# Patient Record
Sex: MALE | Race: WHITE
[De-identification: names, ages, dates, MRNs, and addresses within clinical notes are randomized per-mention and may not be internally consistent; named-entity substitution may affect disease eponyms.]

---

## 2017-11-15 NOTE — REP
Clinical:  Pain.

 

Technique:  Internal rotation, external rotation, and Y view of the right

shoulder.

 

Findings:

Cortical irregularity, spurring and possible loose bodies involving the

acromioclavicular joint cannot be excluded.  The glenohumeral joint appears

intact and normal.  Subacromial space appears normal.

 

Impression:

Moderate arthritic degenerative changes involving the acromioclavicular joint.

 

 

Signed by

Robert Cage MD 11/14/2017 05:35 P

## 2020-02-27 NOTE — ECGEPIP
[facility] - ED

                                       

                                       Test Date:    2020

Pat Name:     LUIS MIGUEL CHAUHAN           Department:   

Patient ID:   [id number]                 Room:         -

Gender:       Male                     Technician:   julia

:          1990               Requested By: ANASTASIYA SALEH

Order Number: [id number]     Reading MD:   Anastasiya Long

                                 Measurements

Intervals                              Axis          

Rate:         52                       P:            51

HI:           142                      QRS:          49

QRSD:         101                      T:            45

QT:           440                                    

QTc:          411                                    

                           Interpretive Statements

SINUS BRADYCARDIA WITH sinus arrythmia

Comparison tracing not on file

Electronically Signed on 2020 23:20:32 EST by Anastasiya Long

## 2020-03-01 NOTE — HPEPDOC
[facility] Medical History & Physical


Date of Admission


Mar 1, 2020


Date of Service:  Mar 1, 2020


Attending Physician:  GONDAL,KHUBAIB N. MD





History and Physical


CHIEF COMPLAINT: Admitted to inpatient mental health unit for hallucinations





HISTORY OF PRESENT ILLNESS: 29-year-old male with past medical history of 

extensive IV drug use is admitted to inpatient mental health unit for 

hallucinations. Patient reports multiple occurrences of insomnia for days after 

taking lots of uppers. During this event he took Maddi, reports being awake for 

days, started hallucinating about the hospital. He denies any thoughts of 

depression or suicide. He denies any previous history of infection including 

cellulitis, bacteremia or endocarditis. Patient denies any history of illness 

from sharing needles. He has no complaints at this time, denies any shortness of

breath, chest pain, nausea, vomiting, abdominal pain, diarrhea or constipation.





10 point review of system is negative except for above





PAST MEDICAL HISTORY:


1. Drug abuse.





PAST SURGICAL HISTORY:


1. Left hand surgery due to injury.





SOCIAL HISTORY:


Current smoker, smokes half a pack per day.


Denies alcohol use.


Active drug user, currently using Maddi





FAMILY HISTORY:


Denies family history of malignancy or heart disease





ALLERGIES: Please see below.





HOME MEDICATIONS: Please see below. 





PHYSICAL EXAMINATION:


VITAL SIGNS: Please see below.


GENERAL: No distress


HEENT: Normocephalic, atraumatic, moist mucous membranes


NECK: Supple


CARDIOVASCULAR EXAMINATION: S1, S2, no murmurs


RESPIRATORY EXAMINATION: Clear to auscultation, no wheezing


ABDOMINAL EXAMINATION: Soft, nontender, nondistended, positive bowel sounds


EXTREMITIES: Range of motion intact


SKIN: Multiple track marks noted on both arms


NEUROLOGICAL EXAMINATION: Alert and oriented 3, no focal deficits 


PSYCHIATRIC EXAMINATION: Calm and cooperative





LABORATORY DATA: See below.





MICROBIOLOGY: Please see below. 





ASSESSMENT: 29-year-old male with history of drug abuse, admitted to inpatient 

mental health unit for hallucinations





PLAN:


1. Hallucinations.


 Management as per primary team





2. Elevated LFTs.


 Possibly related to hepatitis given history of IV drug use, hepatitis panel 

ordered. Patient is symptomatic





Vital Signs





Vital Signs








  Date Time  Temp Pulse Resp B/P (MAP) Pulse Ox O2 Delivery O2 Flow Rate FiO2


 


3/1/20 06:52 99.0 66 16 118/64 (82)    


 


2/28/20 17:22     100 Room Air  











Home Medications


No Active Prescriptions or Reported Meds





Allergies


Coded Allergies:  


     No Known Allergies (Unverified , 2/27/20)





A-FIB/CHADSVASC


A-FIB History


Current/History of A-Fib/PAF?:  No











GONDAL,KHUBAIB N. MD            Mar 1, 2020 12:30

## 2020-03-01 NOTE — MHHPE
DATE OF ADMISSION:   02/28/2020

 

VITAL SIGNS:  Blood pressure 129/84, pulse 113, temperature 98.4.

 

CHIEF COMPLAINT:   "I was hallucinating."

 

SUBJECTIVE:   He is 29 years old.  He is single.  He stays with his mother and

apparently moved back to this area, living with his mother recently, and prior to

that was in [place], [place], where, according to the emergency room chart, he

was with an ex girlfriend.

 

He says that he has been hallucinating and that he has tended to do that whenever

he has used drugs, says most recently was using heroin, was injecting it, as well

as "Maddi" (MDMA) and says was up for several nights and then began hearing

voices.  He says that they belong to familiar people, friends of his as well,

says they would address him, but would not talk among themselves.  He denies that

they were giving him any commands.  He says that he feels that they are

diminished now.

 

He says that he has had similar experiences in the past when he has used drugs

and has stayed up several nights in a row.

 

He says that he realizes the hallucinations and that they are always associated

with his drug use.

 

He says that there are other times when he is not using drugs but that he still

has periods when he is up, not sleeping, feels more irritable during that time,

says feels somewhat tired, acknowledges is more impulsive during those periods,

financially and socially but did not go into details, and that those periods last

for a few days and then he tends to "crash."

 

In the emergency room , he had suggested that he had microchips in his body, says

did not recall saying that or feeling that, does say when he is using drugs that

his thoughts become disorganized somewhat.

 

Denies that he has had thoughts of harming himself.

 

He also says that he has had periods when he does not sleep much, at least since

his early 20s, possibly earlier.

 

When in the emergency room, had suggested that he had microchips in his body, he

thought that he was getting shocked in the right flank, but has reported no such

thoughts now.  He says that he does not remember that, and that he has had such

thoughts when using drugs in the past.

 

The record also suggests that he has used Narcan in the past.

 

Other thoughts when seen in the emergency room included belief that people are

touching his penis or putting objects into his anus and that he could feel it

because it was in his stomach.

 

PAST PSYCHIATRIC HISTORY:  He was apparently hospitalized at [facility] in

2019 in the summer, had attempted killing himself by cutting his wrist.  Unclear

if he attends any outpatient care.  Per history, he has a history of depression,

anxiety, and posttraumatic stress disorder (PTSD), but I am not aware of

details.

 

SUBSTANCE ABUSE HISTORY:  He has a history of substance abuse, opiates, Maddi.

He says that he is due to go to an outpatient rehabilitation facility, he is

unclear where it is, then says that he thinks that he may have needed to have

started yesterday.

 

SOCIAL HISTORY:  Details are not known in any great amount, but he stays with his

mother, he was in [place], [place] just prior to this.  He suggests that his

mother has difficulties with moods and that she is on medicine, but she has been

doing well.  He also suggests a sibling has similar difficulties.

 

MENTAL STATUS EXAMINATION:

He is fairly neat, seems to have had some scratches on his face.  Cooperative

though a bit guarded. There is no agitation.  No psychomotor retardation.  He is

coherent.  Affect is restricted but shows reactivity. Denies any suicidal

thoughts or intents. No homicidal ideas or intents.  Currently, he does not

appear to be internally preoccupied.  No fluctuation of consciousness.  No overt

delusions elicited at present either.   Cognition is grossly intact.  Intellect

is average.  He is able to maintain and shift attention adequately. Judgment and

insight are questionable.

 

ASSESSMENT:

1.  Other specified psychotic disorder.

2.  Rule out bipolar disorder.

3.  Rule out psychotic disorder due to drug misuse.

4.  Opiate use disorder.

 

The patient describes periods of hallucinations, possibly paranoia though unable

to describe that, but only when using drugs.  He says that he tends to stay up

for several nights and the drugs may be inducing possible manic episodes with

psychosis.

 

He suggests that he has had mood fluctuations independent of drug use and this

brings into question the possibility of bipolar disorder in addition to secondary

chang.  He may well not be a reliable historian either.

 

PLAN:   He is admitted to the inpatient psychiatry unit and placed on relevant

precautions. I would suggest obtaining collateral information for a nelson

picture.   Would also treat symptomatically but would hold off on using a

psychotropic on a scheduled basis, may not need one.  The possibility; however,

of using a mood stabilizer, depending on further information obtained, needs to

be considered.

 

He will be involved in individual, group and milieu therapy.   He will receive a

consultation from medicine if indicated.  He will be discharged with followup

once he is stable.  I would anticipate a 5 to 7 day stay.

 

The assessment took 45 minutes.

## 2020-03-02 NOTE — MHIPN
DATE:  03/01/2020

 

VITAL SIGNS:  Blood pressure 118/64, pulse 66, temperature 99.

 

CHIEF COMPLAINT:   Says feels okay.

 

SUBJECTIVE:   He is seen for followup in the presence of staff.  Says feels okay

but bored.  He says that he slept well, took the trazodone, which he says helped.

 

He denies any auditory hallucinations.  He says that he was visited by his mother

and that went well.

 

Says has been on gabapentin at 600 mg three times a day for a few years.  He says

that it is used in his case for mood stability, as well as "nerve pain."

 

He suggests that it helps with his mood stability, though I am not sure that is

accurate.

 

MENTAL STATUS EXAMINATION

He is lying in bed.  He is cooperative, though possibly a bit superficially so.

No agitation.  No psychomotor retardation.  He is coherent.  Affect is restricted

but reactive.  Denies any thoughts of harming himself or anyone else.  Denies any

auditory or visual hallucinations.  Does not appear to be internally preoccupied.

No delusions elicited.  Cognition is grossly intact.  Judgment and insight fair,

possibly improved.

 

ASSESSMENT:

1.  Other specified psychotic disorder.

2.  Possibility of drug induced hallucinations ought to be considered.

 

There is also the possibility of bipolar disorder concomitant with the substance

misuse.

 

PLAN:  Continue current care.  Obtain collateral information.  We will look at

resuming the gabapentin at 600 mg and titrate it upwards, start off with it being

taken once a day.

 

Collateral information would help clarify the diagnosis further.

 

He is to be encouraged to participate in activities in the unit.  He will be

seeing the treatment team, as well as the assigned psychiatrist tomorrow.

## 2020-03-02 NOTE — MHIPNPDOC
[facility] Progress Note


Progress Note








Inpatient Progress Note


Kwaku Travis


MRN: N/A


Date of Birth: N/A


Date of Service: 03/02/2020


History of Present Illness


The patient, a 29-year-old man with a history of significant substance use, 

presents intoxicated with delusions and paranoia.


Interval History





Narrative: The patient reports feeling much improved, does feel some withdrawal 

from his heroin use prior to admission.


Affective: Denies any symptoms.


Psychotic: Denies any symptoms.


Anxiety: Worries about being discharged.


Eating and sleeping behaviors: Within normal limits.


Group Attendance: Infrequent.


Medication Side effects: See ROS below


Behavioral problems/significant events overnight: None reported.


Staff Report: Patient generally amenable, although minimally involved. 





Review Of Systems





General: Denies fever or appetite changes 


Cardiovascular: Denies Chest pain or palpations


GI: Denies Nausea, vomiting, or bowel changes


Respiratory: Denies shortness of breath or cough


Neuro: Denies dizziness, tremors


Derm: Denies any rashes or pruritus


: Denies any dysuria or urinary problems 


MSK:  Reports significant back pain


HEENT: Denies any vision changes or headaches


Psychotherapy


None on this visit.


Vital Signs


Reviewed.


Mental Status Examination





General: Well dressed with good hygiene


Speech: Spontaneous and fluid


Thought processes: Linear and logical


MSK: Smooth and coordinated gait, no signs of tremors or involuntary orofacial 

movements


Thought content: Future orientated


Abstract reasoning, and computation: Intact


Description of associations: Intact


Description of abnormal or psychotic thoughts: Denies any suicidal or homicidal 

ideation. Denies any auditory or visual hallucinations. Does not appear to be 

responding to internal stimuli. Does not appear to be endorsing any bizarre or 

paranoid ideation.


Judgment: fair


Insight: fair


Orientation: Alert and orientated 3


Cognition: Grossly normal


Recent and remote memory: Intact


Attention span and concentration: Intact


Fund of knowledge: Adequate


Mood: "okay"


Affect: Euthymic with a full range 


Diagnoses


Unspecified psychotic disorder.


Highly likely substance induced.


Opioid use disorder, severe.


Hallucinogen use disorder, severe.


Assessment and Plan


Unspecified psychotic disorder: Resolved, likely substance induced.


Opioid use disorder: One dose of buprenorphine to detox patient. Discussed with 

patient about outpatient addiction resources


Hallucinogen use disorder: Recommend outpatient treatment versus rehab.


Disposition


Discharge tomorrow if patient continues to have a normal mental status exam and 

is in behavioral control.


Time Spent


15 minutes


Monday





Vital Signs





Vital Signs








  Date Time  Temp Pulse Resp B/P (MAP) Pulse Ox O2 Delivery O2 Flow Rate FiO2


 


3/2/20 06:30 97.6 104 14 126/81 (96)    


 


2/28/20 17:22     100 Room Air  











Laboratory Data


24H Labs


Laboratory Tests 2


3/2/20 06:18: 





Current Medications





Current Medications








 Medications


  (Trade)  Dose


 Ordered  Sig/Thom


 Route


 PRN Reason  Start Time


 Stop Time Status Last Admin


Dose Admin


 


 Acetaminophen


  (Tylenol Tab)  650 mg  Q6HP  PRN


 PO


 HEADACHE or DISCOMFORT  2/28/20 15:30


    2/29/20 19:41





 


 Al Hydrox/Mg


 Hydrox/Simethicone


  (Mylanta)  30 ml  Q4HP  PRN


 PO


 HEARTBURN/INDIGESTION  2/28/20 15:30


     





 


 Gabapentin


  (Neurontin)  300 mg  BID


 PO


   3/1/20 09:00


    3/2/20 09:13





 


 Home Med


  (Med Rec


 Complete!)    ASDIRECTED


 XX


   2/28/20 14:00


 2/28/20 13:50 DC  





 


 Magnesium


 Hydroxide


  (Milk Of


 Magnesia)  30 ml  DAILYPRN  PRN


 PO


 CONSTIPATION  2/28/20 15:30


     





 


 Olanzapine


  (ZyPREXA


 **ZYDIS**)  5 mg  Q6HP  PRN


 PO


 ANXIETY/AGITATION  2/29/20 19:00


    3/1/20 22:46





 


 Trazodone HCl


  (Desyrel)  50 mg  QHSP  PRN


 PO


 INSOMNIA  2/28/20 15:30


    3/1/20 20:08














Allergies


Coded Allergies:  


     No Known Allergies (Unverified , 2/27/20)











STEW RODRIGUEZ DO               Mar 2, 2020 10:29

## 2020-03-03 NOTE — MHDSPDOC
[facility] Discharge Summary


Discharge Summary


DATE OF ADMISSION: Feb 28, 2020 at 15:27 


DATE OF DISCHARGE: 3/3/20





Kwaku Travis





Discharge


Kwaku Travis


Select Gender


MRN: N/A


Date of Birth: [birth year]


Date of Service: 03/03/2020


Diagnoses





Unspecified psychotic disorder.


Highly likely substance induced.


Opioid use disorder, severe.


Hallucinogen use disorder, severe.


History of Present Illness





The patient, a 29-year-old man with a history of significant substance use, p

resents intoxicated with delusions and paranoia.


Consultants Involved


Hospitalist/PCP screening


Treatment and Progress On The Unit


The patient was admitted to the inpatient mental health unit. It appeared quite 

obvious that initially he had been psychotic from intoxication on a number of 

hallucinogens. He did well on just supportive treatment and returned to a normal

mental status. He had no major behavioral problems and attended groups at times.

He was met with several times where it appeared quite clear that he had returned

to his baseline level of understanding of the situation. His mother had reported

concerns as his behavior had been quite dysregulated when he was intoxicated and

that he has significantly relapsed on a number of different substances over the 

last year. She relates some concerns the day prior to discharge, when I had 

spoken to her no new information was gleaned other than his relapse had caused 

him to become very dysregulated, but she did admit when he was sober he had no 

symptoms. I discussed with the patient the options of an antipsychotic and the 

risks and benefits, especially with the concern that they would be generally 

contraindicated for long-term use given that he primarily has likely substance-

induced psychosis from his methamphetamine and kendell. I gave suggestions to the 

mother about assisted outpatient treatment and rehab referrals to the patient, 

he was precontemplative but was open to the idea of coming to our addiction 

clinic to see about further treatment. He was given 1 dose of buprenorphine to 

soothe his heroin withdrawals with positive effects.


Discharge Assessment


The patient, a 29-year-old man, with a history of significant substance use over

the past year, who's recently relapsed on heroin and multiple hallucinogens pres

ents intoxicated with psychotic symptoms that resolved without any significant 

interventions, leading this provider to believe they are primarily substance 

induced. Information from his mother doesn't contribute to the diagnostic or 

prognostic picture significantly, as her concerns are validated when spoken to, 

she was explained the limits of involuntary commitment as the patient was not 

demonstrating any further symptoms since his intoxication had resolved.


 The patient at the time of discharge did not meet criteria for involuntary 

admission/extension due to having a normal mental status exam, fair insight into

the situation, They are engaged in the discharge process, as well as being 

friendly and amenable in behavioral control and havent been engaging in any 

observed concerning behavior or ideation recently. They decline voluntary 

extension/admission at this time and must be discharged in good homa, as Im 

unable to make a case for holding the patient against their will. They may have 

historical risk factors of admissions and other interactions with psychiatry 

however, those are not modifiable from a clinical perspective. The patient will 

need to be discharged in good homa.  


Mental Status Examination


General: Well dressed with good hygiene


Speech: Spontaneous and fluid


Thought processes: Linear and logical


MSK: Smooth and coordinated gait, no signs of tremors or involuntary orofacial 

movements


Thought content: Future orientated


Abstract reasoning, and computation: Intact


Description of associations: Intact


Description of abnormal or psychotic thoughts: Denies any suicidal or homicidal 

ideation. Denies any auditory or visual hallucinations. Does not appear to be 

responding to internal stimuli. Does not appear to be endorsing any bizarre or 

paranoid ideation.


Judgment: fair


Insight: fair


Orientation: Alert and orientated 3


Cognition: Grossly normal


Recent and remote memory: Intact


Attention span and concentration: Intact


Fund of knowledge: Adequate


Mood: "okay"


Affect: Euthymic with a full range


Follow Up








The social work team worked during the predischarge meeting in order to evaluate

for further issues of lethality address them fully before discharge. They worked

on safety planning with the patient's family members in order to ensure that the

patient will have a safe and effective discharge.


Time Spent


The amount of time spent in the coordination of care for this patient was approx

imately 50 minutes.





Vital Signs/I&Os





Vital Signs








  Date Time  Temp Pulse Resp B/P (MAP) Pulse Ox O2 Delivery O2 Flow Rate FiO2


 


3/3/20 06:40 97.5 83 12 121/73 (89)  Room Air  


 


2/28/20 17:22     100   











Medications


No Active Prescriptions or Reported Meds





Allergies


Coded Allergies:  


     No Known Allergies (Unverified , 2/27/20)











STEW RODRIGUEZ DO               Mar 3, 2020 10:54

## 2023-01-24 ENCOUNTER — HOSPITAL ENCOUNTER (INPATIENT)
Dept: HOSPITAL 53 - M ED | Age: 33
LOS: 3 days | Discharge: HOME | DRG: 750 | End: 2023-01-27
Attending: PSYCHIATRY & NEUROLOGY | Admitting: PSYCHIATRY & NEUROLOGY
Payer: COMMERCIAL

## 2023-01-24 VITALS — BODY MASS INDEX: 25.77 KG/M2 | WEIGHT: 190.26 LBS | HEIGHT: 72 IN

## 2023-01-24 DIAGNOSIS — Z56.0: ICD-10-CM

## 2023-01-24 DIAGNOSIS — Z79.899: ICD-10-CM

## 2023-01-24 DIAGNOSIS — Z91.14: ICD-10-CM

## 2023-01-24 DIAGNOSIS — F25.9: Primary | ICD-10-CM

## 2023-01-24 DIAGNOSIS — F13.10: ICD-10-CM

## 2023-01-24 DIAGNOSIS — F17.200: ICD-10-CM

## 2023-01-24 LAB
ALBUMIN SERPL BCG-MCNC: 4.7 G/DL (ref 3.2–5.2)
ALP SERPL-CCNC: 75 U/L (ref 46–116)
ALT SERPL W P-5'-P-CCNC: 52 U/L (ref 7–40)
AMPHETAMINES UR QL SCN: NEGATIVE
APAP SERPL-MCNC: < 2 UG/ML (ref 10–20)
AST SERPL-CCNC: 92 U/L (ref ?–34)
BARBITURATES UR QL SCN: NEGATIVE
BENZODIAZ UR QL SCN: NEGATIVE
BILIRUB CONJ SERPL-MCNC: 0.4 MG/DL (ref ?–0.4)
BILIRUB SERPL-MCNC: 0.9 MG/DL (ref 0.3–1.2)
BUN SERPL-MCNC: 24 MG/DL (ref 9–23)
BZE UR QL SCN: NEGATIVE
CALCIUM SERPL-MCNC: 9.2 MG/DL (ref 8.5–10.1)
CANNABINOIDS UR QL SCN: NEGATIVE
CHLORIDE SERPL-SCNC: 99 MMOL/L (ref 98–107)
CO2 SERPL-SCNC: 24 MMOL/L (ref 20–31)
CREAT SERPL-MCNC: 0.77 MG/DL (ref 0.7–1.3)
ETHANOL SERPL-MCNC: 0 % (ref 0–0.01)
GFR SERPL CREATININE-BSD FRML MDRD: > 60 ML/MIN/{1.73_M2} (ref 60–?)
GLUCOSE SERPL-MCNC: 84 MG/DL (ref 60–100)
HCT VFR BLD AUTO: 40.2 % (ref 42–52)
HGB BLD-MCNC: 13.1 G/DL (ref 13.5–17.5)
MCH RBC QN AUTO: 28.1 PG (ref 27–33)
MCHC RBC AUTO-ENTMCNC: 32.6 G/DL (ref 32–36.5)
MCV RBC AUTO: 86.1 FL (ref 80–96)
METHADONE UR QL SCN: NEGATIVE
OPIATES UR QL SCN: NEGATIVE
PCP UR QL SCN: NEGATIVE
PLATELET # BLD AUTO: 341 10^3/UL (ref 150–450)
POTASSIUM SERPL-SCNC: 3.9 MMOL/L (ref 3.5–5.1)
PROT SERPL-MCNC: 7.8 G/DL (ref 5.7–8.2)
RBC # BLD AUTO: 4.67 10^6/UL (ref 4.3–6.1)
SALICYLATES SERPL-MCNC: < 3 MG/DL (ref ?–30)
SODIUM SERPL-SCNC: 137 MMOL/L (ref 136–145)
TSH SERPL DL<=0.005 MIU/L-ACNC: 0.41 UIU/ML (ref 0.55–4.78)
WBC # BLD AUTO: 11.7 10^3/UL (ref 4–10)

## 2023-01-24 RX ADMIN — BUPRENORPHINE AND NALOXONE SCH TAB: 8; 2 TABLET SUBLINGUAL at 19:54

## 2023-01-24 RX ADMIN — GABAPENTIN SCH MG: 300 CAPSULE ORAL at 19:54

## 2023-01-24 SDOH — ECONOMIC STABILITY - INCOME SECURITY: UNEMPLOYMENT, UNSPECIFIED: Z56.0

## 2023-01-25 VITALS — SYSTOLIC BLOOD PRESSURE: 137 MMHG | DIASTOLIC BLOOD PRESSURE: 79 MMHG

## 2023-01-25 RX ADMIN — NICOTINE SCH PATCH: 21 PATCH, EXTENDED RELEASE TRANSDERMAL at 09:00

## 2023-01-25 RX ADMIN — BUPRENORPHINE AND NALOXONE SCH TAB: 8; 2 TABLET SUBLINGUAL at 10:12

## 2023-01-25 RX ADMIN — GABAPENTIN SCH MG: 300 CAPSULE ORAL at 18:04

## 2023-01-25 RX ADMIN — GABAPENTIN SCH MG: 300 CAPSULE ORAL at 10:12

## 2023-01-25 RX ADMIN — GABAPENTIN SCH MG: 300 CAPSULE ORAL at 20:42

## 2023-01-26 VITALS — DIASTOLIC BLOOD PRESSURE: 80 MMHG | SYSTOLIC BLOOD PRESSURE: 150 MMHG

## 2023-01-26 VITALS — SYSTOLIC BLOOD PRESSURE: 109 MMHG | DIASTOLIC BLOOD PRESSURE: 57 MMHG

## 2023-01-26 RX ADMIN — BUPRENORPHINE AND NALOXONE SCH TAB: 8; 2 TABLET SUBLINGUAL at 14:00

## 2023-01-26 RX ADMIN — BUPRENORPHINE AND NALOXONE SCH TAB: 8; 2 TABLET SUBLINGUAL at 10:50

## 2023-01-26 RX ADMIN — NICOTINE SCH PATCH: 21 PATCH, EXTENDED RELEASE TRANSDERMAL at 09:00

## 2023-01-26 RX ADMIN — GABAPENTIN SCH MG: 300 CAPSULE ORAL at 20:35

## 2023-01-26 RX ADMIN — GABAPENTIN SCH MG: 300 CAPSULE ORAL at 16:58

## 2023-01-26 RX ADMIN — GABAPENTIN SCH MG: 300 CAPSULE ORAL at 10:50

## 2023-01-27 VITALS — DIASTOLIC BLOOD PRESSURE: 58 MMHG | SYSTOLIC BLOOD PRESSURE: 124 MMHG

## 2023-01-27 RX ADMIN — BUPRENORPHINE AND NALOXONE SCH TAB: 8; 2 TABLET SUBLINGUAL at 09:07

## 2023-01-27 RX ADMIN — GABAPENTIN SCH MG: 300 CAPSULE ORAL at 09:07

## 2023-01-27 RX ADMIN — NICOTINE SCH PATCH: 21 PATCH, EXTENDED RELEASE TRANSDERMAL at 09:00
